# Patient Record
Sex: MALE | URBAN - METROPOLITAN AREA
[De-identification: names, ages, dates, MRNs, and addresses within clinical notes are randomized per-mention and may not be internally consistent; named-entity substitution may affect disease eponyms.]

---

## 2021-03-25 NOTE — PROGRESS NOTES
PT Evaluation     Today's date: 3/26/2021  Patient name: Kaylie Shay  : 1960  MRN: [id number]  Referring provider: Sue Kelley MD  Dx:   Encounter Diagnosis     ICD-10-CM    1  Pain of right hand  M79 641    2  Weakness of right hand  R29 898    3  Ulnar neuropathy at elbow of right upper extremity  G56 21        Start Time: 1200  Stop Time: 1245  Total time in clinic (min): 45 minutes    Assessment  Assessment details: Kaylie Shay is a 56 y o  male who presents with signs and symptoms consistent of ulnar nerve neuropathy  Patient presents with numbness, decreased interossei and 4th/5th digit strength, decreased digit ROM, decreased sensation in the ulnar distribution  Patient demosntrate neural tension with ULTT 3 indicating involvement of the ulnar nerve as well as positive Froment and Wartenberg signs  Symptoms are consistent with ulnar nerve involvement however, unclear of where the entrapment is occurring  Possibility of nerve irritation and compression proximal in the neck, cubital tunnel, and distally in the Guyon's canal  Will incorporate all component proximal to distal to see if this helps with sensation and strength of the last two digits  Will compared with results of EMG in the near future  Due to these impairments, Patient has difficulty performing a/iadls, recreational activities and work-related activities  Patient would benefit from skilled physical therapy to address the impairments, improve their level of function, and to improve their overall quality of life  Impairments: abnormal coordination, abnormal or restricted ROM, abnormal movement, activity intolerance, impaired physical strength, lacks appropriate home exercise program and pain with function    Symptom irritability: moderateUnderstanding of Dx/Px/POC: good   Prognosis: good    Goals  Short Term Goals: to be achieved by 4 weeks  1) Patient to be independent with basic HEP    2) Increase  strength by 2-3 lbs in all deficient planes  Long Term Goals: to be achieved by discharge  1) FOTO equal to or greater than target score indicating improvements with overall function  2) Patient to be independent with comprehensive HEP  4) Patient will be able to manipulate objects with greater  in order to participate in daily activities  5) Patient will have improvements in sensation in order to improve quality of life  6) patient will be able to return to all recreational activities without restrictions in order to improve quality of life  Plan  Plan details: 1-2x for 4-6 weeks   Patient would benefit from: PT eval and skilled physical therapy  Planned therapy interventions: manual therapy, neuromuscular re-education, patient education, therapeutic activities, therapeutic exercise and home exercise program  Treatment plan discussed with: patient        Subjective Evaluation    History of Present Illness  Mechanism of injury: History of Current Injury: Patient notes last July he woke up and numbness, tingling, and some weakness for about two weeks to a month  After that it started to get a little better but continue to persist with feeling numb  Patient denies a certain GURU  Patient reports he was using a high power drill that tweeked his shoulder a little bit but that was about a month prior to the the hand numbness  Patient also notes that he gets occasional tightness in his pec and is a persistent sleeper with his arm above his head  Patient notes that he sees the neurology in like two weeks for EMG  Pain location/Descriptors: Patient has no pain in the finger it is just numb  Aggravating factors: gripping cups and objects; doing any manipulation with the finger  Easing factors: none    24 HR pattern: no changes   Imaging: x-ray   Special Questions: no history of cancer; Denies Ns and Ds; no sleep disturbances  Patient goals: Patient reports his goals for physical therapy would be decrease the numbness and get back to normal with his hand for recreational activities  Hobbies/Interest: being active (skiiing, softball; golf; bowl)   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; 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     Pain  Current pain ratin  At best pain ratin  At worst pain ratin    Patient Goals  Patient goals for therapy: return to sport/leisure activities and increased strength  Patient goal: Patient reports his goals for physical therapy would be decrease the numbness and get back to normal with his hand for recreational activities           Objective     Neurological Testing     Sensation     Wrist/Hand   Left   Intact: light touch and pin prick    Right   Intact: light touch and pin prick    Reflexes   Left   Biceps (C5/C6): normal (2+)  Brachioradialis (C6): trace (1+)  Fam's reflex: negative    Right   Biceps (C5/C6): normal (2+)  Brachioradialis (C6): trace (1+)  Fam's reflex: negative    Additional Neurological Details  Sensations decreased compared to the L hand     Negative for wrist clonus       Cranial nerve testing:   Smooth pursuits: intact   Saccades: negative both directions   Conversion: normal           Active Range of Motion   Cervical/Thoracic Spine       Cervical    Flexion:  WFL  Extension:  WFL  Left lateral flexion:  WFL  Right lateral flexion:  WFL  Left rotation:  WFL  Right rotation:  WFL    Right Thumb   Opposition: Able to perform opposition    Additional Active Range of Motion Details  Unable to perform MCP full extension with 4th and 5th digit     Strength/Myotome Testing   Cervical Spine     Left   Interossei strength (t1): 4    Right   Interossei strength (t1): 5    Left Shoulder     Planes of Motion   Flexion: 5   Abduction: 5     Right Shoulder     Planes of Motion   Flexion: 5   Abduction: 5     Left Elbow   Flexion: 5  Extension: 5    Right Elbow   Flexion: 5  Extension: 5    Left Wrist/Hand   Wrist extension: 5  Wrist flexion: 5  Radial deviation: 5  Ulnar deviation: 5     (2nd hand position)     Trial 1: 65    Thumb Strength  Key/Lateral Pinch     Trial 1: 11    Right Wrist/Hand   Wrist extension: 5  Radial deviation: 5  Ulnar deviation: 5     (2nd hand position)     Trial 1: 55    Thumb Strength   Key/Lateral Pinch     Trial 1: 4    Additional Strength Details  Unable to extend pinky and ring finger to neutral      Tests   Cervical   Negative vertical compression and lumbar distraction  Right   Negative Spurling's Test A  Right Shoulder   Positive ULTT4  Right Elbow   Positive Tinel's sign (cubital tunnel)  Right Wrist/Hand   Positive Froment's sign  Negative Phalen's sign  Lumbar   Negative vertical compression       Additional Tests Details  Allens: negative   Wartenberg's: positive for weakness      Flowsheet Rows      Most Recent Value   PT/OT G-Codes   Current Score  69   Projected Score  76             Precautions: n/a      Manuals             Ulnar nerve glides                                                     Neuro Re-Ed             Ulnar nerve glides              Hand glides                                                                               Ther Ex             Wrist extension stretch              Wrist flexion stretch             Wrist flexion DB             4th and 5th flexion with finger  web             5th finger adduction with finger web             UT trap stretch              Pec stretch doorway                           Ther Activity             Theragrip             Key  with putty             Ball gripping              Gait Training                                       Modalities

## 2021-03-26 ENCOUNTER — TRANSCRIBE ORDERS (OUTPATIENT)
Dept: PHYSICAL THERAPY | Facility: CLINIC | Age: 61
End: 2021-03-26

## 2021-03-26 ENCOUNTER — EVALUATION (OUTPATIENT)
Dept: PHYSICAL THERAPY | Facility: CLINIC | Age: 61
End: 2021-03-26
Payer: COMMERCIAL

## 2021-03-26 DIAGNOSIS — R29.898 WEAKNESS OF RIGHT HAND: ICD-10-CM

## 2021-03-26 DIAGNOSIS — G56.21 ULNAR NEUROPATHY AT ELBOW OF RIGHT UPPER EXTREMITY: ICD-10-CM

## 2021-03-26 DIAGNOSIS — M79.641 PAIN OF RIGHT HAND: Primary | ICD-10-CM

## 2021-03-26 PROCEDURE — 97110 THERAPEUTIC EXERCISES: CPT | Performed by: PHYSICAL THERAPIST

## 2021-03-26 PROCEDURE — 97162 PT EVAL MOD COMPLEX 30 MIN: CPT | Performed by: PHYSICAL THERAPIST

## 2021-03-26 NOTE — LETTER
2021    Boone Monsalve MD  [facility]    Patient: Cindy Alaniz   YOB: 1960   Date of Visit: 3/26/2021     Encounter Diagnosis     ICD-10-CM    1  Pain of right hand  M79 641    2  Weakness of right hand  R29 898    3  Ulnar neuropathy at elbow of right upper extremity  G56 21        Dear Dr Christi Franco:    Thank you for your recent referral of Cindy Alaniz  Please review the attached evaluation summary from Eric's recent visit  Please verify that you agree with the plan of care by signing the attached order  If you have any questions or concerns, please do not hesitate to call  I sincerely appreciate the opportunity to share in the care of one of your patients and hope to have another opportunity to work with you in the near future  Sincerely,    Nadia Bonilla, PT      Referring Provider:      I certify that I have read the below Plan of Care and certify the need for these services furnished under this plan of treatment while under my care  Boone Monsalve, [address] [address]  [address] [address]  Via Fax: [phone]          PT Evaluation     Today's date: 3/26/2021  Patient name: Cindy Alaniz  : 1960  MRN: [id number]  Referring provider: Vandana Sung MD  Dx:   Encounter Diagnosis     ICD-10-CM    1  Pain of right hand  M79 641    2  Weakness of right hand  R29 898    3  Ulnar neuropathy at elbow of right upper extremity  G56 21        Start Time: 1200  Stop Time: 1245  Total time in clinic (min): 45 minutes    Assessment  Assessment details: Cindy Alaniz is a 56 y o  male who presents with signs and symptoms consistent of ulnar nerve neuropathy  Patient presents with numbness, decreased interossei and 4th/5th digit strength, decreased digit ROM, decreased sensation in the ulnar distribution   Patient demosntrate neural tension with ULTT 3 indicating involvement of the ulnar nerve as well as positive Froment and Wartenberg signs  Symptoms are consistent with ulnar nerve involvement however, unclear of where the entrapment is occurring  Possibility of nerve irritation and compression proximal in the neck, cubital tunnel, and distally in the Guyon's canal  Will incorporate all component proximal to distal to see if this helps with sensation and strength of the last two digits  Will compared with results of EMG in the near future  Due to these impairments, Patient has difficulty performing a/iadls, recreational activities and work-related activities  Patient would benefit from skilled physical therapy to address the impairments, improve their level of function, and to improve their overall quality of life  Impairments: abnormal coordination, abnormal or restricted ROM, abnormal movement, activity intolerance, impaired physical strength, lacks appropriate home exercise program and pain with function    Symptom irritability: moderateUnderstanding of Dx/Px/POC: good   Prognosis: good    Goals  Short Term Goals: to be achieved by 4 weeks  1) Patient to be independent with basic HEP  2) Increase  strength by 2-3 lbs in all deficient planes  Long Term Goals: to be achieved by discharge  1) FOTO equal to or greater than target score indicating improvements with overall function  2) Patient to be independent with comprehensive HEP  4) Patient will be able to manipulate objects with greater  in order to participate in daily activities  5) Patient will have improvements in sensation in order to improve quality of life  6) patient will be able to return to all recreational activities without restrictions in order to improve quality of life        Plan  Plan details: 1-2x for 4-6 weeks   Patient would benefit from: PT eval and skilled physical therapy  Planned therapy interventions: manual therapy, neuromuscular re-education, patient education, therapeutic activities, therapeutic exercise and home exercise program  Treatment plan discussed with: patient        Subjective Evaluation    History of Present Illness  Mechanism of injury: History of Current Injury: Patient notes last July he woke up and numbness, tingling, and some weakness for about two weeks to a month  After that it started to get a little better but continue to persist with feeling numb  Patient denies a certain GURU  Patient reports he was using a high power drill that tweeked his shoulder a little bit but that was about a month prior to the the hand numbness  Patient also notes that he gets occasional tightness in his pec and is a persistent sleeper with his arm above his head  Patient notes that he sees the neurology in like two weeks for EMG  Pain location/Descriptors: Patient has no pain in the finger it is just numb  Aggravating factors: gripping cups and objects; doing any manipulation with the finger  Easing factors: none  24 HR pattern: no changes   Imaging: x-ray   Special Questions: no history of cancer; Denies Ns and Ds; no sleep disturbances  Patient goals: Patient reports his goals for physical therapy would be decrease the numbness and get back to normal with his hand for recreational activities  Hobbies/Interest: being active (skiiing, softball; golf; bowl)   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; 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     Pain  Current pain ratin  At best pain ratin  At worst pain ratin    Patient Goals  Patient goals for therapy: return to sport/leisure activities and increased strength  Patient goal: Patient reports his goals for physical therapy would be decrease the numbness and get back to normal with his hand for recreational activities           Objective     Neurological Testing     Sensation     Wrist/Hand   Left   Intact: light touch and pin prick    Right   Intact: light touch and pin prick    Reflexes   Left   Biceps (C5/C6): normal (2+)  Brachioradialis (C6): trace (1+)  Fam's reflex: negative    Right   Biceps (C5/C6): normal (2+)  Brachioradialis (C6): trace (1+)  Fam's reflex: negative    Additional Neurological Details  Sensations decreased compared to the L hand     Negative for wrist clonus       Cranial nerve testing:   Smooth pursuits: intact   Saccades: negative both directions   Conversion: normal           Active Range of Motion   Cervical/Thoracic Spine       Cervical    Flexion:  WFL  Extension:  WFL  Left lateral flexion:  WFL  Right lateral flexion:  WFL  Left rotation:  WFL  Right rotation:  WFL    Right Thumb   Opposition: Able to perform opposition    Additional Active Range of Motion Details  Unable to perform MCP full extension with 4th and 5th digit     Strength/Myotome Testing   Cervical Spine     Left   Interossei strength (t1): 4    Right   Interossei strength (t1): 5    Left Shoulder     Planes of Motion   Flexion: 5   Abduction: 5     Right Shoulder     Planes of Motion   Flexion: 5   Abduction: 5     Left Elbow   Flexion: 5  Extension: 5    Right Elbow   Flexion: 5  Extension: 5    Left Wrist/Hand   Wrist extension: 5  Wrist flexion: 5  Radial deviation: 5  Ulnar deviation: 5     (2nd hand position)     Trial 1: 65    Thumb Strength  Key/Lateral Pinch     Trial 1: 11    Right Wrist/Hand   Wrist extension: 5  Radial deviation: 5  Ulnar deviation: 5     (2nd hand position)     Trial 1: 55    Thumb Strength   Key/Lateral Pinch     Trial 1: 4    Additional Strength Details  Unable to extend pinky and ring finger to neutral      Tests   Cervical   Negative vertical compression and lumbar distraction  Right   Negative Spurling's Test A  Right Shoulder   Positive ULTT4  Right Elbow   Positive Tinel's sign (cubital tunnel)  Right Wrist/Hand   Positive Froment's sign  Negative Phalen's sign  Lumbar   Negative vertical compression       Additional Tests Details  Allens: negative Kary's: positive for weakness      Flowsheet Rows      Most Recent Value   PT/OT G-Codes   Current Score  69   Projected Score  76             Precautions: n/a      Manuals             Ulnar nerve glides                                                     Neuro Re-Ed             Ulnar nerve glides              Hand glides                                                                               Ther Ex             Wrist extension stretch              Wrist flexion stretch             Wrist flexion DB             4th and 5th flexion with finger  web             5th finger adduction with finger web             UT trap stretch              Pec stretch doorway                           Ther Activity             Theragrip             Key  with putty             Ball gripping              Gait Training                                       Modalities

## 2021-03-30 ENCOUNTER — OFFICE VISIT (OUTPATIENT)
Dept: PHYSICAL THERAPY | Facility: CLINIC | Age: 61
End: 2021-03-30
Payer: COMMERCIAL

## 2021-03-30 DIAGNOSIS — M79.641 PAIN OF RIGHT HAND: Primary | ICD-10-CM

## 2021-03-30 DIAGNOSIS — R29.898 WEAKNESS OF RIGHT HAND: ICD-10-CM

## 2021-03-30 DIAGNOSIS — G56.21 ULNAR NEUROPATHY AT ELBOW OF RIGHT UPPER EXTREMITY: ICD-10-CM

## 2021-03-30 PROCEDURE — 97530 THERAPEUTIC ACTIVITIES: CPT | Performed by: PHYSICAL THERAPIST

## 2021-03-30 PROCEDURE — 97112 NEUROMUSCULAR REEDUCATION: CPT | Performed by: PHYSICAL THERAPIST

## 2021-03-30 PROCEDURE — 97140 MANUAL THERAPY 1/> REGIONS: CPT | Performed by: PHYSICAL THERAPIST

## 2021-03-30 PROCEDURE — 97110 THERAPEUTIC EXERCISES: CPT | Performed by: PHYSICAL THERAPIST

## 2021-03-30 NOTE — PROGRESS NOTES
Daily Note     Today's date: 3/30/2021  Patient name: Sharron Chambers  : 1960  MRN: [id number]  Referring provider: Cortney Trujillo MD  Dx:   Encounter Diagnosis     ICD-10-CM    1  Pain of right hand  M79 641    2  Weakness of right hand  R29 898    3  Ulnar neuropathy at elbow of right upper extremity  G56 21        Start Time: 1445  Stop Time: 1530  Total time in clinic (min): 45 minutes    Subjective: Patient reports, "The thumb might feel a little stronger  I might have       Objective: See treatment diary below      Assessment: Patient tolerated treatment well  Patient responded well to the introduction of exercise program this date  Patient had no adverse effects to exercises  Patient challenged with active movement of the 4th and 5th finger  Patient had slight decrease in numbness of the 5th digit with manual ulnar nerve glides  Patient would benefit from continued PT      Plan: Continue per plan of care        Precautions: n/a      Manuals 3/30            Ulnar nerve glides                                                     Neuro Re-Ed             Ulnar nerve glides  10x             Hand glides  5x                                                                             Ther Ex             Wrist extension stretch              Wrist flexion stretch             Wrist flexion DB             AAROM  Trace muscle activation   10x             UT trap stretch              Pec stretch doorway  3x30"                          Ther Activity             Theragrip 30x Blue            Key  with web  2x1 min red web             Ball gripping  2x1 min             Gait Training                                       Modalities

## 2021-04-06 ENCOUNTER — OFFICE VISIT (OUTPATIENT)
Dept: PHYSICAL THERAPY | Facility: CLINIC | Age: 61
End: 2021-04-06
Payer: COMMERCIAL

## 2021-04-06 DIAGNOSIS — G56.21 ULNAR NEUROPATHY AT ELBOW OF RIGHT UPPER EXTREMITY: ICD-10-CM

## 2021-04-06 DIAGNOSIS — M79.641 PAIN OF RIGHT HAND: Primary | ICD-10-CM

## 2021-04-06 DIAGNOSIS — R29.898 WEAKNESS OF RIGHT HAND: ICD-10-CM

## 2021-04-06 PROCEDURE — 97530 THERAPEUTIC ACTIVITIES: CPT | Performed by: PHYSICAL THERAPIST

## 2021-04-06 PROCEDURE — 97140 MANUAL THERAPY 1/> REGIONS: CPT | Performed by: PHYSICAL THERAPIST

## 2021-04-06 PROCEDURE — 97110 THERAPEUTIC EXERCISES: CPT | Performed by: PHYSICAL THERAPIST

## 2021-04-06 PROCEDURE — 97112 NEUROMUSCULAR REEDUCATION: CPT | Performed by: PHYSICAL THERAPIST

## 2021-04-06 NOTE — PROGRESS NOTES
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 upper extremity  G56 21        Start Time: 1445  Stop Time: 1530  Total time in clinic (min): 45 minutes    Subjective: Patient reports, "I am having little to no numbness or "falling asleep" feeling in the pinky and ring finger  I have been doing my exercises multiple times a day and I feel like I definitely have more movement than I did prior to starting therapy "       Objective: See treatment diary below      Assessment: Patient tolerated treatment well  Patient had no adverse effects to exercises  Patient challenged with active movement of the 4th and 5th finger however able to perform with more range when isolated  Patient unable to hold in extension and required assistance for eccentric lowering  Patient had reported decrease in numbness of the 5th digit and tightness along the length of the nerve with manual ulnar nerve glides  Patient would benefit from continued PT      Plan: Continue per plan of care        Precautions: n/a    Manuals 3/30 4/6           Ulnar nerve glides  ACL ACL                                                  Neuro Re-Ed             Ulnar nerve glides  10x  10x            Hand glides  5x 10x                                                                            Ther Ex             Wrist extension stretch              Wrist flexion stretch             Wrist flexion DB             AAROM  Trace muscle activation   10x  Trace muscle activation   10x            UT trap stretch              Pec stretch doorway  3x30"  3x30"                         Ther Activity             Theragrip 30x Blue 40x Blue           Key  with web  2x1 min red web  2x1 min red web            Ball gripping  2x1 min  2x1 min            Gait Training                                       Modalities                                          -

## 2021-04-26 NOTE — PROGRESS NOTES
PT DISCHARGE    Patient receiving EMG for further investigation into symptoms  Patient to return to PT after results  Patient appropriate for DC this date